# Patient Record
Sex: FEMALE | Race: WHITE | Employment: FULL TIME | URBAN - METROPOLITAN AREA
[De-identification: names, ages, dates, MRNs, and addresses within clinical notes are randomized per-mention and may not be internally consistent; named-entity substitution may affect disease eponyms.]

---

## 2024-11-21 ENCOUNTER — OFFICE VISIT (OUTPATIENT)
Age: 24
End: 2024-11-21

## 2024-11-21 VITALS
SYSTOLIC BLOOD PRESSURE: 103 MMHG | BODY MASS INDEX: 25.58 KG/M2 | DIASTOLIC BLOOD PRESSURE: 71 MMHG | HEIGHT: 62 IN | HEART RATE: 83 BPM | TEMPERATURE: 97.8 F | OXYGEN SATURATION: 98 % | WEIGHT: 139 LBS

## 2024-11-21 DIAGNOSIS — Z02.1 PRE-EMPLOYMENT HEALTH SCREENING EXAMINATION: Primary | ICD-10-CM

## 2024-11-22 NOTE — PROGRESS NOTES
Vanna Welsh (:  2000) is a 24 y.o. female,New patient, here for evaluation of the following chief complaint(s):  Employment Physical         Assessment & Plan  Pre-employment health screening examination   Cleared for work duties without restriction           No follow-ups on file.       Subjective   Pt here for work physical  Denies any physical or mental health issues  Immunizations up to date      History provided by:  Patient      Review of Systems       Objective   Physical Exam             An electronic signature was used to authenticate this note.    --Mary Velazquez, APRN - CNP